# Patient Record
Sex: FEMALE | Race: ASIAN | NOT HISPANIC OR LATINO
[De-identification: names, ages, dates, MRNs, and addresses within clinical notes are randomized per-mention and may not be internally consistent; named-entity substitution may affect disease eponyms.]

---

## 2018-02-22 PROBLEM — Z00.00 ENCOUNTER FOR PREVENTIVE HEALTH EXAMINATION: Status: ACTIVE | Noted: 2018-02-22

## 2018-02-28 ENCOUNTER — APPOINTMENT (OUTPATIENT)
Dept: ULTRASOUND IMAGING | Facility: CLINIC | Age: 38
End: 2018-02-28
Payer: COMMERCIAL

## 2018-02-28 ENCOUNTER — OUTPATIENT (OUTPATIENT)
Dept: OUTPATIENT SERVICES | Facility: HOSPITAL | Age: 38
LOS: 1 days | End: 2018-02-28
Payer: COMMERCIAL

## 2018-02-28 DIAGNOSIS — Z00.8 ENCOUNTER FOR OTHER GENERAL EXAMINATION: ICD-10-CM

## 2018-02-28 PROCEDURE — 76700 US EXAM ABDOM COMPLETE: CPT | Mod: 26

## 2018-02-28 PROCEDURE — 76700 US EXAM ABDOM COMPLETE: CPT

## 2018-05-23 ENCOUNTER — APPOINTMENT (OUTPATIENT)
Dept: HEPATOLOGY | Facility: CLINIC | Age: 38
End: 2018-05-23
Payer: COMMERCIAL

## 2018-05-23 VITALS
BODY MASS INDEX: 20.77 KG/M2 | DIASTOLIC BLOOD PRESSURE: 62 MMHG | WEIGHT: 110 LBS | OXYGEN SATURATION: 98 % | HEART RATE: 75 BPM | TEMPERATURE: 98.5 F | HEIGHT: 61 IN | SYSTOLIC BLOOD PRESSURE: 94 MMHG

## 2018-05-23 DIAGNOSIS — Z86.19 PERSONAL HISTORY OF OTHER INFECTIOUS AND PARASITIC DISEASES: ICD-10-CM

## 2018-05-23 PROCEDURE — 99243 OFF/OP CNSLTJ NEW/EST LOW 30: CPT

## 2018-05-23 RX ORDER — TENOFOVIR DISOPROXIL FUMARATE 300 MG/1
300 TABLET ORAL
Qty: 30 | Refills: 0 | Status: DISCONTINUED | COMMUNITY
Start: 2018-05-20 | End: 2018-05-23

## 2018-05-24 LAB
AFP-TM SERPL-MCNC: 1.7 NG/ML
ALBUMIN SERPL ELPH-MCNC: 4.2 G/DL
ALP BLD-CCNC: 59 U/L
ALT SERPL-CCNC: 29 U/L
ANION GAP SERPL CALC-SCNC: 14 MMOL/L
AST SERPL-CCNC: 27 U/L
BASOPHILS # BLD AUTO: 0.01 K/UL
BASOPHILS NFR BLD AUTO: 0.2 %
BILIRUB SERPL-MCNC: 0.5 MG/DL
BUN SERPL-MCNC: 11 MG/DL
CALCIUM SERPL-MCNC: 9.4 MG/DL
CHLORIDE SERPL-SCNC: 102 MMOL/L
CO2 SERPL-SCNC: 25 MMOL/L
CREAT SERPL-MCNC: 0.84 MG/DL
EOSINOPHIL # BLD AUTO: 0.04 K/UL
EOSINOPHIL NFR BLD AUTO: 0.7 %
GLUCOSE SERPL-MCNC: 86 MG/DL
HBV CORE IGG+IGM SER QL: REACTIVE
HBV DNA # SERPL NAA+PROBE: NORMAL IU/ML
HBV SURFACE AB SER QL: NONREACTIVE
HBV SURFACE AG SER QL: REACTIVE
HCT VFR BLD CALC: 40 %
HCV AB SER QL: NONREACTIVE
HCV S/CO RATIO: 0.14 S/CO
HEPB DNA PCR LOG: ABNORMAL LOGIU/ML
HGB BLD-MCNC: 13.3 G/DL
IMM GRANULOCYTES NFR BLD AUTO: 0.3 %
LYMPHOCYTES # BLD AUTO: 1.51 K/UL
LYMPHOCYTES NFR BLD AUTO: 25 %
MAN DIFF?: NORMAL
MCHC RBC-ENTMCNC: 30.8 PG
MCHC RBC-ENTMCNC: 33.3 GM/DL
MCV RBC AUTO: 92.6 FL
MONOCYTES # BLD AUTO: 0.36 K/UL
MONOCYTES NFR BLD AUTO: 6 %
NEUTROPHILS # BLD AUTO: 4.1 K/UL
NEUTROPHILS NFR BLD AUTO: 67.8 %
PLATELET # BLD AUTO: 192 K/UL
POTASSIUM SERPL-SCNC: 3.8 MMOL/L
PROT SERPL-MCNC: 7.6 G/DL
RBC # BLD: 4.32 M/UL
RBC # FLD: 13 %
SODIUM SERPL-SCNC: 141 MMOL/L
WBC # FLD AUTO: 6.04 K/UL

## 2018-05-25 LAB
HBV E AB SER QL: POSITIVE
HBV E AG SER QL: NEGATIVE

## 2018-05-30 LAB — HDV AB SER IA-ACNC: NEGATIVE

## 2018-06-01 LAB
ESTRADIOL SERPL HS-MCNC: NORMAL
ESTRIOL SERPL-MCNC: NORMAL
ESTRONE SERPL-MCNC: NORMAL
HEPATITIS B GENOTYPE & DRUG RESISTANCE: NOT DETECTED

## 2018-06-06 ENCOUNTER — APPOINTMENT (OUTPATIENT)
Dept: HEPATOLOGY | Facility: CLINIC | Age: 38
End: 2018-06-06
Payer: COMMERCIAL

## 2018-06-06 PROCEDURE — 91200 LIVER ELASTOGRAPHY: CPT

## 2018-12-06 ENCOUNTER — APPOINTMENT (OUTPATIENT)
Dept: HEPATOLOGY | Facility: CLINIC | Age: 38
End: 2018-12-06
Payer: COMMERCIAL

## 2018-12-06 VITALS
TEMPERATURE: 98.6 F | DIASTOLIC BLOOD PRESSURE: 79 MMHG | HEART RATE: 79 BPM | SYSTOLIC BLOOD PRESSURE: 117 MMHG | RESPIRATION RATE: 14 BRPM | BODY MASS INDEX: 20.77 KG/M2 | WEIGHT: 110 LBS | HEIGHT: 61 IN

## 2018-12-06 PROCEDURE — 99213 OFFICE O/P EST LOW 20 MIN: CPT

## 2018-12-07 LAB
AFP-TM SERPL-MCNC: 1.7 NG/ML
ALBUMIN SERPL ELPH-MCNC: 4.3 G/DL
ALP BLD-CCNC: 52 U/L
ALT SERPL-CCNC: 20 U/L
ANION GAP SERPL CALC-SCNC: 13 MMOL/L
AST SERPL-CCNC: 22 U/L
BASOPHILS # BLD AUTO: 0.01 K/UL
BASOPHILS NFR BLD AUTO: 0.2 %
BILIRUB SERPL-MCNC: 0.5 MG/DL
BUN SERPL-MCNC: 11 MG/DL
CALCIUM SERPL-MCNC: 9.2 MG/DL
CHLORIDE SERPL-SCNC: 103 MMOL/L
CO2 SERPL-SCNC: 25 MMOL/L
CREAT SERPL-MCNC: 0.7 MG/DL
EOSINOPHIL # BLD AUTO: 0.05 K/UL
EOSINOPHIL NFR BLD AUTO: 0.9 %
GLUCOSE SERPL-MCNC: 77 MG/DL
HBV DNA # SERPL NAA+PROBE: NOT DETECTED IU/ML
HBV SURFACE AB SER QL: NONREACTIVE
HBV SURFACE AG SER QL: REACTIVE
HCT VFR BLD CALC: 38.5 %
HEPB DNA PCR LOG: NOT DETECTED LOGIU/ML
HGB BLD-MCNC: 12.5 G/DL
IMM GRANULOCYTES NFR BLD AUTO: 0.2 %
LYMPHOCYTES # BLD AUTO: 1.71 K/UL
LYMPHOCYTES NFR BLD AUTO: 30.3 %
MAN DIFF?: NORMAL
MCHC RBC-ENTMCNC: 29.8 PG
MCHC RBC-ENTMCNC: 32.5 GM/DL
MCV RBC AUTO: 91.7 FL
MONOCYTES # BLD AUTO: 0.4 K/UL
MONOCYTES NFR BLD AUTO: 7.1 %
NEUTROPHILS # BLD AUTO: 3.46 K/UL
NEUTROPHILS NFR BLD AUTO: 61.3 %
PLATELET # BLD AUTO: 228 K/UL
POTASSIUM SERPL-SCNC: 3.8 MMOL/L
PROT SERPL-MCNC: 7.3 G/DL
RBC # BLD: 4.2 M/UL
RBC # FLD: 13.8 %
SODIUM SERPL-SCNC: 141 MMOL/L
WBC # FLD AUTO: 5.64 K/UL

## 2018-12-11 ENCOUNTER — APPOINTMENT (OUTPATIENT)
Dept: ULTRASOUND IMAGING | Facility: CLINIC | Age: 38
End: 2018-12-11

## 2018-12-18 ENCOUNTER — FORM ENCOUNTER (OUTPATIENT)
Age: 38
End: 2018-12-18

## 2018-12-19 ENCOUNTER — OUTPATIENT (OUTPATIENT)
Dept: OUTPATIENT SERVICES | Facility: HOSPITAL | Age: 38
LOS: 1 days | End: 2018-12-19
Payer: COMMERCIAL

## 2018-12-19 ENCOUNTER — APPOINTMENT (OUTPATIENT)
Dept: ULTRASOUND IMAGING | Facility: CLINIC | Age: 38
End: 2018-12-19
Payer: COMMERCIAL

## 2018-12-19 DIAGNOSIS — Z00.8 ENCOUNTER FOR OTHER GENERAL EXAMINATION: ICD-10-CM

## 2018-12-19 PROCEDURE — 76700 US EXAM ABDOM COMPLETE: CPT

## 2018-12-19 PROCEDURE — 76700 US EXAM ABDOM COMPLETE: CPT | Mod: 26

## 2019-06-13 ENCOUNTER — APPOINTMENT (OUTPATIENT)
Dept: HEPATOLOGY | Facility: CLINIC | Age: 39
End: 2019-06-13
Payer: COMMERCIAL

## 2019-06-13 VITALS
RESPIRATION RATE: 14 BRPM | SYSTOLIC BLOOD PRESSURE: 112 MMHG | TEMPERATURE: 98.2 F | WEIGHT: 110 LBS | HEIGHT: 61 IN | HEART RATE: 76 BPM | BODY MASS INDEX: 20.77 KG/M2 | DIASTOLIC BLOOD PRESSURE: 66 MMHG

## 2019-06-13 PROCEDURE — 99214 OFFICE O/P EST MOD 30 MIN: CPT

## 2019-06-13 RX ORDER — ERGOCALCIFEROL 1.25 MG/1
1.25 MG CAPSULE, LIQUID FILLED ORAL
Qty: 12 | Refills: 0 | Status: DISCONTINUED | COMMUNITY
Start: 2018-02-21 | End: 2019-06-13

## 2019-06-14 LAB
AFP-TM SERPL-MCNC: <1.8 NG/ML
ALBUMIN SERPL ELPH-MCNC: 4.4 G/DL
ALP BLD-CCNC: 60 U/L
ALT SERPL-CCNC: 27 U/L
ANION GAP SERPL CALC-SCNC: 11 MMOL/L
APPEARANCE: CLEAR
AST SERPL-CCNC: 27 U/L
BACTERIA: NEGATIVE
BASOPHILS # BLD AUTO: 0.02 K/UL
BASOPHILS NFR BLD AUTO: 0.3 %
BILIRUB SERPL-MCNC: 0.3 MG/DL
BILIRUBIN URINE: NEGATIVE
BLOOD URINE: NEGATIVE
BUN SERPL-MCNC: 13 MG/DL
CALCIUM SERPL-MCNC: 9.2 MG/DL
CHLORIDE SERPL-SCNC: 104 MMOL/L
CO2 SERPL-SCNC: 26 MMOL/L
COLOR: COLORLESS
CREAT SERPL-MCNC: 0.84 MG/DL
EOSINOPHIL # BLD AUTO: 0.08 K/UL
EOSINOPHIL NFR BLD AUTO: 1.3 %
GLUCOSE QUALITATIVE U: NEGATIVE
GLUCOSE SERPL-MCNC: 92 MG/DL
HBV SURFACE AB SER QL: NONREACTIVE
HBV SURFACE AG SER QL: REACTIVE
HCT VFR BLD CALC: 37.1 %
HGB BLD-MCNC: 11.9 G/DL
HYALINE CASTS: 0 /LPF
IMM GRANULOCYTES NFR BLD AUTO: 0.2 %
KETONES URINE: NEGATIVE
LEUKOCYTE ESTERASE URINE: NEGATIVE
LYMPHOCYTES # BLD AUTO: 2.04 K/UL
LYMPHOCYTES NFR BLD AUTO: 33.3 %
MAN DIFF?: NORMAL
MCHC RBC-ENTMCNC: 28.8 PG
MCHC RBC-ENTMCNC: 32.1 GM/DL
MCV RBC AUTO: 89.8 FL
MICROSCOPIC-UA: NORMAL
MONOCYTES # BLD AUTO: 0.36 K/UL
MONOCYTES NFR BLD AUTO: 5.9 %
NEUTROPHILS # BLD AUTO: 3.62 K/UL
NEUTROPHILS NFR BLD AUTO: 59 %
NITRITE URINE: NEGATIVE
PH URINE: 7.5
PLATELET # BLD AUTO: 230 K/UL
POTASSIUM SERPL-SCNC: 4 MMOL/L
PROT SERPL-MCNC: 7 G/DL
PROTEIN URINE: NEGATIVE
RBC # BLD: 4.13 M/UL
RBC # FLD: 13.8 %
RED BLOOD CELLS URINE: 2 /HPF
SODIUM SERPL-SCNC: 141 MMOL/L
SPECIFIC GRAVITY URINE: 1.01
SQUAMOUS EPITHELIAL CELLS: 6 /HPF
UROBILINOGEN URINE: NORMAL
WBC # FLD AUTO: 6.13 K/UL
WHITE BLOOD CELLS URINE: 3 /HPF

## 2019-06-19 LAB
HBV DNA # SERPL NAA+PROBE: NOT DETECTED IU/ML
HEPB DNA PCR LOG: NOT DETECTED LOGIU/ML

## 2019-06-25 LAB
MISCELLANEOUS TEST: NORMAL
PROC NAME: NORMAL

## 2019-06-26 ENCOUNTER — APPOINTMENT (OUTPATIENT)
Dept: ULTRASOUND IMAGING | Facility: IMAGING CENTER | Age: 39
End: 2019-06-26

## 2019-06-26 ENCOUNTER — APPOINTMENT (OUTPATIENT)
Dept: RADIOLOGY | Facility: IMAGING CENTER | Age: 39
End: 2019-06-26

## 2019-12-06 ENCOUNTER — APPOINTMENT (OUTPATIENT)
Dept: HEPATOLOGY | Facility: CLINIC | Age: 39
End: 2019-12-06
Payer: COMMERCIAL

## 2019-12-06 VITALS
SYSTOLIC BLOOD PRESSURE: 120 MMHG | DIASTOLIC BLOOD PRESSURE: 71 MMHG | BODY MASS INDEX: 21.9 KG/M2 | TEMPERATURE: 98.1 F | HEIGHT: 61 IN | RESPIRATION RATE: 14 BRPM | HEART RATE: 72 BPM | WEIGHT: 116 LBS

## 2019-12-06 PROCEDURE — 99213 OFFICE O/P EST LOW 20 MIN: CPT

## 2019-12-06 RX ORDER — TENOFOVIR DISOPROXIL FUMARATE 300 MG/1
300 TABLET ORAL
Qty: 30 | Refills: 5 | Status: DISCONTINUED | COMMUNITY
End: 2019-12-06

## 2019-12-08 LAB
AFP-TM SERPL-MCNC: <1.8 NG/ML
ALBUMIN SERPL ELPH-MCNC: 4.5 G/DL
ALP BLD-CCNC: 57 U/L
ALT SERPL-CCNC: 24 U/L
ANION GAP SERPL CALC-SCNC: 15 MMOL/L
AST SERPL-CCNC: 23 U/L
BASOPHILS # BLD AUTO: 0.03 K/UL
BASOPHILS NFR BLD AUTO: 0.5 %
BILIRUB SERPL-MCNC: 0.2 MG/DL
BUN SERPL-MCNC: 12 MG/DL
CALCIUM SERPL-MCNC: 9.2 MG/DL
CHLORIDE SERPL-SCNC: 102 MMOL/L
CO2 SERPL-SCNC: 25 MMOL/L
CREAT SERPL-MCNC: 0.85 MG/DL
EOSINOPHIL # BLD AUTO: 0.07 K/UL
EOSINOPHIL NFR BLD AUTO: 1.1 %
GLUCOSE SERPL-MCNC: 96 MG/DL
HBV CORE IGG+IGM SER QL: REACTIVE
HBV SURFACE AB SER QL: NONREACTIVE
HBV SURFACE AG SER QL: REACTIVE
HCT VFR BLD CALC: 39.1 %
HGB BLD-MCNC: 12.9 G/DL
IMM GRANULOCYTES NFR BLD AUTO: 0.2 %
LYMPHOCYTES # BLD AUTO: 2 K/UL
LYMPHOCYTES NFR BLD AUTO: 30.1 %
MAN DIFF?: NORMAL
MCHC RBC-ENTMCNC: 30.3 PG
MCHC RBC-ENTMCNC: 33 GM/DL
MCV RBC AUTO: 91.8 FL
MONOCYTES # BLD AUTO: 0.36 K/UL
MONOCYTES NFR BLD AUTO: 5.4 %
NEUTROPHILS # BLD AUTO: 4.18 K/UL
NEUTROPHILS NFR BLD AUTO: 62.7 %
PLATELET # BLD AUTO: 243 K/UL
POTASSIUM SERPL-SCNC: 3.9 MMOL/L
PROT SERPL-MCNC: 7 G/DL
RBC # BLD: 4.26 M/UL
RBC # FLD: 13.2 %
SODIUM SERPL-SCNC: 142 MMOL/L
WBC # FLD AUTO: 6.65 K/UL

## 2019-12-09 LAB
HBV DNA # SERPL NAA+PROBE: NOT DETECTED
HEPB DNA PCR LOG: NOT DETECTED LOG10IU/ML

## 2019-12-10 LAB — HBV E AG SER QL: NEGATIVE

## 2019-12-11 LAB — HBV E AB SER QL: POSITIVE

## 2020-02-22 ENCOUNTER — APPOINTMENT (OUTPATIENT)
Dept: ULTRASOUND IMAGING | Facility: HOSPITAL | Age: 40
End: 2020-02-22
Payer: COMMERCIAL

## 2020-02-22 ENCOUNTER — APPOINTMENT (OUTPATIENT)
Dept: RADIOLOGY | Facility: HOSPITAL | Age: 40
End: 2020-02-22
Payer: COMMERCIAL

## 2020-02-22 ENCOUNTER — OUTPATIENT (OUTPATIENT)
Dept: OUTPATIENT SERVICES | Facility: HOSPITAL | Age: 40
LOS: 1 days | End: 2020-02-22
Payer: COMMERCIAL

## 2020-02-22 DIAGNOSIS — B18.1 CHRONIC VIRAL HEPATITIS B WITHOUT DELTA-AGENT: ICD-10-CM

## 2020-02-22 PROCEDURE — 76700 US EXAM ABDOM COMPLETE: CPT

## 2020-02-22 PROCEDURE — 77080 DXA BONE DENSITY AXIAL: CPT | Mod: 26

## 2020-02-22 PROCEDURE — 76700 US EXAM ABDOM COMPLETE: CPT | Mod: 26

## 2020-02-22 PROCEDURE — 77080 DXA BONE DENSITY AXIAL: CPT

## 2020-06-09 ENCOUNTER — APPOINTMENT (OUTPATIENT)
Dept: HEPATOLOGY | Facility: CLINIC | Age: 40
End: 2020-06-09
Payer: COMMERCIAL

## 2020-06-09 PROCEDURE — 99443: CPT

## 2020-07-15 ENCOUNTER — APPOINTMENT (OUTPATIENT)
Dept: HEPATOLOGY | Facility: CLINIC | Age: 40
End: 2020-07-15

## 2020-07-20 LAB
AFP-TM SERPL-MCNC: 1.8 NG/ML
ALBUMIN SERPL ELPH-MCNC: 4.4 G/DL
ALP BLD-CCNC: 53 U/L
ALT SERPL-CCNC: 21 U/L
ANION GAP SERPL CALC-SCNC: 13 MMOL/L
AST SERPL-CCNC: 22 U/L
BASOPHILS # BLD AUTO: 0.02 K/UL
BASOPHILS NFR BLD AUTO: 0.4 %
BILIRUB SERPL-MCNC: 0.3 MG/DL
BUN SERPL-MCNC: 13 MG/DL
CALCIUM SERPL-MCNC: 9.1 MG/DL
CHLORIDE SERPL-SCNC: 102 MMOL/L
CO2 SERPL-SCNC: 25 MMOL/L
CREAT SERPL-MCNC: 0.79 MG/DL
EOSINOPHIL # BLD AUTO: 0.05 K/UL
EOSINOPHIL NFR BLD AUTO: 1 %
GLUCOSE SERPL-MCNC: 95 MG/DL
HBV SURFACE AB SER QL: NONREACTIVE
HBV SURFACE AG SER QL: REACTIVE
HCT VFR BLD CALC: 42.7 %
HGB BLD-MCNC: 13.7 G/DL
IMM GRANULOCYTES NFR BLD AUTO: 0.2 %
LYMPHOCYTES # BLD AUTO: 1.85 K/UL
LYMPHOCYTES NFR BLD AUTO: 36.9 %
MAN DIFF?: NORMAL
MCHC RBC-ENTMCNC: 30.3 PG
MCHC RBC-ENTMCNC: 32.1 GM/DL
MCV RBC AUTO: 94.5 FL
MONOCYTES # BLD AUTO: 0.36 K/UL
MONOCYTES NFR BLD AUTO: 7.2 %
NEUTROPHILS # BLD AUTO: 2.72 K/UL
NEUTROPHILS NFR BLD AUTO: 54.3 %
PLATELET # BLD AUTO: 214 K/UL
POTASSIUM SERPL-SCNC: 4.1 MMOL/L
PROT SERPL-MCNC: 7.1 G/DL
RBC # BLD: 4.52 M/UL
RBC # FLD: 12.4 %
SODIUM SERPL-SCNC: 140 MMOL/L
WBC # FLD AUTO: 5.01 K/UL

## 2020-07-22 LAB
HBV DNA # SERPL NAA+PROBE: NOT DETECTED
HEPB DNA PCR LOG: NOT DETECTED LOG10IU/ML

## 2020-07-23 ENCOUNTER — OUTPATIENT (OUTPATIENT)
Dept: OUTPATIENT SERVICES | Facility: HOSPITAL | Age: 40
LOS: 1 days | End: 2020-07-23
Payer: COMMERCIAL

## 2020-07-23 ENCOUNTER — APPOINTMENT (OUTPATIENT)
Dept: ULTRASOUND IMAGING | Facility: CLINIC | Age: 40
End: 2020-07-23

## 2020-07-23 DIAGNOSIS — Z00.8 ENCOUNTER FOR OTHER GENERAL EXAMINATION: ICD-10-CM

## 2020-07-23 PROCEDURE — 76700 US EXAM ABDOM COMPLETE: CPT

## 2020-07-23 PROCEDURE — 76700 US EXAM ABDOM COMPLETE: CPT | Mod: 26

## 2021-09-14 ENCOUNTER — APPOINTMENT (OUTPATIENT)
Dept: HEPATOLOGY | Facility: CLINIC | Age: 41
End: 2021-09-14
Payer: COMMERCIAL

## 2021-09-14 VITALS
WEIGHT: 114 LBS | HEIGHT: 61 IN | DIASTOLIC BLOOD PRESSURE: 84 MMHG | BODY MASS INDEX: 21.52 KG/M2 | HEART RATE: 71 BPM | SYSTOLIC BLOOD PRESSURE: 128 MMHG | RESPIRATION RATE: 14 BRPM | TEMPERATURE: 97.9 F

## 2021-09-14 PROCEDURE — 99214 OFFICE O/P EST MOD 30 MIN: CPT

## 2021-09-14 NOTE — REVIEW OF SYSTEMS
[Negative] : Heme/Lymph [Fever] : no fever [Chills] : no chills [Feeling Tired] : not feeling tired [Recent Weight Gain (___ Lbs)] : no recent weight gain [Recent Weight Loss (___ Lbs)] : no recent weight loss [Eye Pain] : no eye pain [Red Eyes] : eyes not red [Eyesight Problems] : no eyesight problems [Dry Eyes] : no dryness of the eyes [Chest Pain] : no chest pain [Palpitations] : no palpitations [Lower Ext Edema] : no lower extremity edema [Shortness Of Breath] : no shortness of breath [Wheezing] : no wheezing [Cough] : no cough [Abdominal Pain] : no abdominal pain [Vomiting] : no vomiting [Constipation] : no constipation [Diarrhea] : no diarrhea [Heartburn] : no heartburn [Melena] : no melena [Limb Pain] : no limb pain [Limb Swelling] : no limb swelling [Itching] : no itching [FreeTextEntry7] : patient denies nausea, hematemesis, rectal bleeding or jaundice.  [FreeTextEntry9] : patient has neck pain

## 2021-09-14 NOTE — ASSESSMENT
[FreeTextEntry1] : 42 y/o Chinese woman here for chronic HBV (eAb+, on Vemlidy).\par \par She has chronic hepatitis B, eAb positive, was started on TDF since 3/2018 by her outside physician when she developed high HBV viremia of 42,142 IU/ml but normal ALT. She responded to TDF and was switched to Vemlidy for better kidney and BMD safety profile since 6/2019.  She has tolerated Vemlidy  without side effects. \par Her viral load has been undetectable. \par \par I have explained to the patient the natural history of chronic hepatitis B, disease progression to cirrhosis and risk of HCC and risks and benefits and side effects of Vemlidy and adverse effects of sudden discontinuation of Vemlidy. \par \par DEXA 2/2020 normal.\par \par Labs and ultrasound q 6 months ordered. \par \par I have recommended avoidance of hepatotoxic agents including herbs and alcohol and  a return visit in 6 months.\par \par RTC 6 months w/ Fibroscan\par \par

## 2021-09-14 NOTE — HISTORY OF PRESENT ILLNESS
[___ Month(s) Ago] : [unfilled] month(s) ago [None] : ~he/she~ had no significant interval events [IV Drug Use] : no IV drug use [Tattoo] : no tattoos [Body Piercing] : no body piercing [Hemodialysis] : no hemodialysis [Transfusion before 1992] : no transfusion before 1992 [Transplant before 1992] : no transplant before 1992 [Alcohol Abuse] : no alcohol abuse [Autoimmune Disorder] : no autoimmune disorder [Household Contact to HBV] : no household contact to HBV [Occupational Exposure] : no occupational exposure [de-identified] : 40 y/o Chinese woman here for chronic HBV (eAb+, on Vemlidy).\par \par She was diagnosed with hepatitis B more than 20 years ago in [place] when she developed elevated liver tests. She reportedly was treated with injection for hepatitis B, (IFN?) for 3 months which was discontinued due to intolerance. \par \par She denies family history of hepatitis B. Her  reportedly is vaccinated against HBV. Her child [place] received HBV immunoprophylaxis at birth.and reportedly does not have hepatitis B.  \par \par [place], had blood work and abdominal US every 6 months to 1 year per her PCP for years. She remained HBV treatment naive till 2/2018 when she developed high HBV  DNA  of 42,142 IU/ml . She was started on TDF per outside physician for high HBV viremia with normal liver tests, She responded to TDF and HBV DNA was detected < 15, ALT was 29 about 3 months later. \par \par Fibroscan from 6/2018 reported S0 steatosis and F0-F1 fibrosis. \par \par Her liver tests have been normal and HBV undetected since 12/2018. HBsAg remained positive and Edwardo HBsAg was 8196.  Abdominal US has been negative for HCC over past several years. \par Patient was switched from  TDF to Vemlidy since 6/2019. She has tolerated Vemlidy  without side effects.\par \par BW from 12/2019 showed normal CBC, normal liver tests, normal AFP, positive HBsAg, positive anti-HBc total, undetected HBV DNA , negative anti-HBs, positive eAb, negative eAg. \par \par Bone density from 2/2020 was normal. \par \par 9/14/21 visit - still tolerating Vemlidy, for about the past 3 years. She had a ultrasound done 1/2021.

## 2021-09-15 LAB
AFP-TM SERPL-MCNC: <1.8 NG/ML
ALBUMIN SERPL ELPH-MCNC: 4.7 G/DL
ALP BLD-CCNC: 62 U/L
ALT SERPL-CCNC: 17 U/L
ANION GAP SERPL CALC-SCNC: 12 MMOL/L
AST SERPL-CCNC: 20 U/L
BASOPHILS # BLD AUTO: 0.02 K/UL
BASOPHILS NFR BLD AUTO: 0.4 %
BILIRUB SERPL-MCNC: 0.2 MG/DL
BUN SERPL-MCNC: 8 MG/DL
CALCIUM SERPL-MCNC: 9.3 MG/DL
CHLORIDE SERPL-SCNC: 105 MMOL/L
CO2 SERPL-SCNC: 26 MMOL/L
CREAT SERPL-MCNC: 0.89 MG/DL
EOSINOPHIL # BLD AUTO: 0.05 K/UL
EOSINOPHIL NFR BLD AUTO: 0.9 %
GLUCOSE SERPL-MCNC: 93 MG/DL
HBV SURFACE AB SER QL: NONREACTIVE
HBV SURFACE AG SER QL: REACTIVE
HCT VFR BLD CALC: 42.8 %
HGB BLD-MCNC: 13.7 G/DL
IMM GRANULOCYTES NFR BLD AUTO: 0.4 %
INR PPP: 1.04 RATIO
LYMPHOCYTES # BLD AUTO: 1.58 K/UL
LYMPHOCYTES NFR BLD AUTO: 28.4 %
MAN DIFF?: NORMAL
MCHC RBC-ENTMCNC: 30.2 PG
MCHC RBC-ENTMCNC: 32 GM/DL
MCV RBC AUTO: 94.3 FL
MONOCYTES # BLD AUTO: 0.35 K/UL
MONOCYTES NFR BLD AUTO: 6.3 %
NEUTROPHILS # BLD AUTO: 3.54 K/UL
NEUTROPHILS NFR BLD AUTO: 63.6 %
PLATELET # BLD AUTO: 235 K/UL
POTASSIUM SERPL-SCNC: 4.3 MMOL/L
PROT SERPL-MCNC: 7.4 G/DL
PT BLD: 12.3 SEC
RBC # BLD: 4.54 M/UL
RBC # FLD: 13.5 %
SODIUM SERPL-SCNC: 143 MMOL/L
WBC # FLD AUTO: 5.56 K/UL

## 2021-09-17 LAB
HBV DNA # SERPL NAA+PROBE: NOT DETECTED IU/ML
HEPB DNA PCR LOG: NOT DETECTED LOG10IU/ML

## 2021-09-21 ENCOUNTER — OUTPATIENT (OUTPATIENT)
Dept: OUTPATIENT SERVICES | Facility: HOSPITAL | Age: 41
LOS: 1 days | End: 2021-09-21
Payer: COMMERCIAL

## 2021-09-21 ENCOUNTER — APPOINTMENT (OUTPATIENT)
Dept: ULTRASOUND IMAGING | Facility: CLINIC | Age: 41
End: 2021-09-21
Payer: COMMERCIAL

## 2021-09-21 DIAGNOSIS — B18.1 CHRONIC VIRAL HEPATITIS B WITHOUT DELTA-AGENT: ICD-10-CM

## 2021-09-21 PROCEDURE — 76700 US EXAM ABDOM COMPLETE: CPT | Mod: 26

## 2021-09-21 PROCEDURE — 76700 US EXAM ABDOM COMPLETE: CPT

## 2021-09-24 DIAGNOSIS — K86.2 CYST OF PANCREAS: ICD-10-CM

## 2021-10-09 ENCOUNTER — APPOINTMENT (OUTPATIENT)
Dept: ULTRASOUND IMAGING | Facility: CLINIC | Age: 41
End: 2021-10-09

## 2021-11-06 ENCOUNTER — APPOINTMENT (OUTPATIENT)
Dept: MRI IMAGING | Facility: CLINIC | Age: 41
End: 2021-11-06
Payer: COMMERCIAL

## 2021-11-06 ENCOUNTER — OUTPATIENT (OUTPATIENT)
Dept: OUTPATIENT SERVICES | Facility: HOSPITAL | Age: 41
LOS: 1 days | End: 2021-11-06
Payer: COMMERCIAL

## 2021-11-06 DIAGNOSIS — K86.2 CYST OF PANCREAS: ICD-10-CM

## 2021-11-06 DIAGNOSIS — Z00.8 ENCOUNTER FOR OTHER GENERAL EXAMINATION: ICD-10-CM

## 2021-11-06 PROCEDURE — 74183 MRI ABD W/O CNTR FLWD CNTR: CPT

## 2021-11-06 PROCEDURE — 74183 MRI ABD W/O CNTR FLWD CNTR: CPT | Mod: 26

## 2021-11-06 PROCEDURE — A9585: CPT

## 2022-03-14 ENCOUNTER — APPOINTMENT (OUTPATIENT)
Dept: HEPATOLOGY | Facility: CLINIC | Age: 42
End: 2022-03-14

## 2022-04-19 ENCOUNTER — APPOINTMENT (OUTPATIENT)
Dept: ULTRASOUND IMAGING | Facility: CLINIC | Age: 42
End: 2022-04-19
Payer: COMMERCIAL

## 2022-04-19 ENCOUNTER — OUTPATIENT (OUTPATIENT)
Dept: OUTPATIENT SERVICES | Facility: HOSPITAL | Age: 42
LOS: 1 days | End: 2022-04-19
Payer: COMMERCIAL

## 2022-04-19 DIAGNOSIS — B18.1 CHRONIC VIRAL HEPATITIS B WITHOUT DELTA-AGENT: ICD-10-CM

## 2022-04-19 DIAGNOSIS — Z00.8 ENCOUNTER FOR OTHER GENERAL EXAMINATION: ICD-10-CM

## 2022-04-19 PROCEDURE — 76700 US EXAM ABDOM COMPLETE: CPT | Mod: 26

## 2022-04-19 PROCEDURE — 76700 US EXAM ABDOM COMPLETE: CPT

## 2022-04-20 LAB
AFP-TM SERPL-MCNC: <1.8 NG/ML
ALBUMIN SERPL ELPH-MCNC: 4.5 G/DL
ALP BLD-CCNC: 59 U/L
ALT SERPL-CCNC: 13 U/L
ANION GAP SERPL CALC-SCNC: 17 MMOL/L
AST SERPL-CCNC: 19 U/L
BASOPHILS # BLD AUTO: 0.02 K/UL
BASOPHILS NFR BLD AUTO: 0.3 %
BILIRUB SERPL-MCNC: 0.4 MG/DL
BUN SERPL-MCNC: 13 MG/DL
CALCIUM SERPL-MCNC: 9.1 MG/DL
CHLORIDE SERPL-SCNC: 104 MMOL/L
CO2 SERPL-SCNC: 22 MMOL/L
CREAT SERPL-MCNC: 0.77 MG/DL
EGFR: 99 ML/MIN/1.73M2
EOSINOPHIL # BLD AUTO: 0.06 K/UL
EOSINOPHIL NFR BLD AUTO: 0.9 %
GLUCOSE SERPL-MCNC: 101 MG/DL
HBV DNA # SERPL NAA+PROBE: <10 IU/ML
HBV SURFACE AB SER QL: NONREACTIVE
HBV SURFACE AG SER QL: REACTIVE
HCT VFR BLD CALC: 40.7 %
HEPB DNA PCR INT: DETECTED
HEPB DNA PCR LOG: <1 LOGIU/ML
HGB BLD-MCNC: 13.5 G/DL
IMM GRANULOCYTES NFR BLD AUTO: 0.2 %
INR PPP: 1.06 RATIO
LYMPHOCYTES # BLD AUTO: 2.02 K/UL
LYMPHOCYTES NFR BLD AUTO: 31.7 %
MAN DIFF?: NORMAL
MCHC RBC-ENTMCNC: 31 PG
MCHC RBC-ENTMCNC: 33.2 GM/DL
MCV RBC AUTO: 93.6 FL
MONOCYTES # BLD AUTO: 0.4 K/UL
MONOCYTES NFR BLD AUTO: 6.3 %
NEUTROPHILS # BLD AUTO: 3.87 K/UL
NEUTROPHILS NFR BLD AUTO: 60.6 %
PLATELET # BLD AUTO: 218 K/UL
POTASSIUM SERPL-SCNC: 3.7 MMOL/L
PROT SERPL-MCNC: 7.1 G/DL
PT BLD: 12.3 SEC
RBC # BLD: 4.35 M/UL
RBC # FLD: 12.9 %
SODIUM SERPL-SCNC: 143 MMOL/L
WBC # FLD AUTO: 6.38 K/UL

## 2022-07-20 ENCOUNTER — APPOINTMENT (OUTPATIENT)
Dept: HEPATOLOGY | Facility: CLINIC | Age: 42
End: 2022-07-20

## 2022-07-20 VITALS
OXYGEN SATURATION: 98 % | DIASTOLIC BLOOD PRESSURE: 85 MMHG | HEIGHT: 61 IN | WEIGHT: 113 LBS | HEART RATE: 71 BPM | TEMPERATURE: 97.6 F | RESPIRATION RATE: 16 BRPM | SYSTOLIC BLOOD PRESSURE: 122 MMHG | BODY MASS INDEX: 21.34 KG/M2

## 2022-07-20 PROCEDURE — 99214 OFFICE O/P EST MOD 30 MIN: CPT

## 2022-07-20 NOTE — HISTORY OF PRESENT ILLNESS
[___ Month(s) Ago] : [unfilled] month(s) ago [None] : ~he/she~ had no significant interval events [IV Drug Use] : no IV drug use [Tattoo] : no tattoos [Body Piercing] : no body piercing [Hemodialysis] : no hemodialysis [Transfusion before 1992] : no transfusion before 1992 [Transplant before 1992] : no transplant before 1992 [Alcohol Abuse] : no alcohol abuse [Autoimmune Disorder] : no autoimmune disorder [Household Contact to HBV] : no household contact to HBV [Occupational Exposure] : no occupational exposure [de-identified] : 43 y/o Chinese woman here for chronic HBV (eAb+, on Vemlidy).\par \par She was diagnosed with hepatitis B more than 20 years ago in [place] when she developed elevated liver tests. She reportedly was treated with injection for hepatitis B, (IFN?) for 3 months which was discontinued due to intolerance. \par \par She denies family history of hepatitis B. Her  reportedly is vaccinated against HBV. Her child [place] received HBV immunoprophylaxis at birth.and reportedly does not have hepatitis B.  \par \par [place], had blood work and abdominal US every 6 months to 1 year per her PCP for years. She remained HBV treatment naive till 2/2018 when she developed high HBV  DNA  of 42,142 IU/ml . She was started on TDF per outside physician for high HBV viremia with normal liver tests, She responded to TDF and HBV DNA was detected < 15, ALT was 29 about 3 months later. \[facility] \[facility] Fibroscan from 6/2018 reported S0 steatosis and F0-F1 fibrosis. \par \par Her liver tests have been normal and HBV undetected since 12/2018. HBsAg remained positive and Edwardo HBsAg was 8196.  Abdominal US has been negative for HCC over past several years. \par Patient was switched from  TDF to Vemlidy since 6/2019. She has tolerated Vemlidy  without side effects.\[facility] BW from 12/2019 showed normal CBC, normal liver tests, normal AFP, positive HBsAg, positive anti-HBc total, undetected HBV DNA , negative anti-HBs, positive eAb, negative eAg. \[facility] Bone density from 2/2020 was normal. \[facility] 9/14/21 visit - still tolerating Vemlidy, for about the past 3 years. She had a ultrasound done 1/2021. \[facility] 7/20/22 visit - doing well. labs from april and ultrasound reviewed. fibroscan scheduled for last visit was cancelled.

## 2023-01-23 ENCOUNTER — APPOINTMENT (OUTPATIENT)
Dept: HEPATOLOGY | Facility: CLINIC | Age: 43
End: 2023-01-23
Payer: COMMERCIAL

## 2023-01-23 PROCEDURE — 76981 USE PARENCHYMA: CPT

## 2023-01-26 ENCOUNTER — APPOINTMENT (OUTPATIENT)
Dept: ULTRASOUND IMAGING | Facility: CLINIC | Age: 43
End: 2023-01-26
Payer: COMMERCIAL

## 2023-01-26 ENCOUNTER — OUTPATIENT (OUTPATIENT)
Dept: OUTPATIENT SERVICES | Facility: HOSPITAL | Age: 43
LOS: 1 days | End: 2023-01-26
Payer: COMMERCIAL

## 2023-01-26 ENCOUNTER — RESULT REVIEW (OUTPATIENT)
Age: 43
End: 2023-01-26

## 2023-01-26 DIAGNOSIS — B18.1 CHRONIC VIRAL HEPATITIS B WITHOUT DELTA-AGENT: ICD-10-CM

## 2023-01-26 LAB
AFP-TM SERPL-MCNC: <1.8 NG/ML
ALBUMIN SERPL ELPH-MCNC: 4.3 G/DL
ALP BLD-CCNC: 53 U/L
ALT SERPL-CCNC: 14 U/L
ANION GAP SERPL CALC-SCNC: 12 MMOL/L
AST SERPL-CCNC: 17 U/L
BASOPHILS # BLD AUTO: 0.03 K/UL
BASOPHILS NFR BLD AUTO: 0.5 %
BILIRUB SERPL-MCNC: 0.3 MG/DL
BUN SERPL-MCNC: 15 MG/DL
CALCIUM SERPL-MCNC: 8.9 MG/DL
CHLORIDE SERPL-SCNC: 106 MMOL/L
CO2 SERPL-SCNC: 23 MMOL/L
CREAT SERPL-MCNC: 0.71 MG/DL
EGFR: 109 ML/MIN/1.73M2
EOSINOPHIL # BLD AUTO: 0.08 K/UL
EOSINOPHIL NFR BLD AUTO: 1.4 %
GLUCOSE SERPL-MCNC: 102 MG/DL
HBV DNA # SERPL NAA+PROBE: <10 IU/ML
HBV SURFACE AG SER QL: REACTIVE
HCT VFR BLD CALC: 38.3 %
HEPB DNA PCR INT: DETECTED
HEPB DNA PCR LOG: <1 LOGIU/ML
HGB BLD-MCNC: 12.8 G/DL
IMM GRANULOCYTES NFR BLD AUTO: 0.2 %
INR PPP: 1.1 RATIO
LYMPHOCYTES # BLD AUTO: 1.57 K/UL
LYMPHOCYTES NFR BLD AUTO: 26.8 %
MAN DIFF?: NORMAL
MCHC RBC-ENTMCNC: 30.7 PG
MCHC RBC-ENTMCNC: 33.4 GM/DL
MCV RBC AUTO: 91.8 FL
MONOCYTES # BLD AUTO: 0.47 K/UL
MONOCYTES NFR BLD AUTO: 8 %
NEUTROPHILS # BLD AUTO: 3.7 K/UL
NEUTROPHILS NFR BLD AUTO: 63.1 %
PLATELET # BLD AUTO: 183 K/UL
POTASSIUM SERPL-SCNC: 4 MMOL/L
PROT SERPL-MCNC: 6.7 G/DL
PT BLD: 12.8 SEC
RBC # BLD: 4.17 M/UL
RBC # FLD: 12.8 %
SODIUM SERPL-SCNC: 142 MMOL/L
WBC # FLD AUTO: 5.86 K/UL

## 2023-01-26 PROCEDURE — 76700 US EXAM ABDOM COMPLETE: CPT | Mod: 26

## 2023-01-26 PROCEDURE — 76700 US EXAM ABDOM COMPLETE: CPT

## 2023-02-06 ENCOUNTER — APPOINTMENT (OUTPATIENT)
Dept: HEPATOLOGY | Facility: CLINIC | Age: 43
End: 2023-02-06
Payer: COMMERCIAL

## 2023-02-06 ENCOUNTER — NON-APPOINTMENT (OUTPATIENT)
Age: 43
End: 2023-02-06

## 2023-02-06 NOTE — HISTORY OF PRESENT ILLNESS
[FreeTextEntry1] : Former pt of Dr. Jordan\par Andres Garduno is a 41 y/o female with chronic hepatitis B on Vemlidy who presents to establish the care. She recently had labs/fibroscan/imaging.  She was diagnosed with hep B in 2002.\par \par [Medical Hx]\par [Surgical Hx]\par [Social Hx] \par Alcohol:                Tobacco:      \par illicit drug:            Herb and dietary Supplement:  \par [FMH of liver disease]\par \par [Labs]\par 1/23/23\par INR 1.10 CBC wnl  \par AFP <1.8 CMP wnl ALT 14\par HBsAg+ HBV DNA detected <10IU/mL\par \par [Imaging]\par US abd 1/26/23: Mildly coarsened hepatic echotexture. No focal hepatic lesion.\par \par Fibroscan 1/23/23: F0-1 3.3kPa S0\par \par MRI/MRCP 11/6/21: No pancreatic lesion identified to correspond with sonographic finding. I suspect in retrospect that the finding on sonogram represents a segment of the tortuous splenic vein in close proximity to the pancreatic tail.\par \par [Procedures]\par EGD:\par Colonoscopy:\par \par [Plan]\par # \par - Education and counseling were provided to the patient.\par - Discussed at length with the patient that next course of action would depend on results\par \par #Preventive care\par DEXA 2/2020 normal.\par

## 2023-02-13 ENCOUNTER — APPOINTMENT (OUTPATIENT)
Dept: HEPATOLOGY | Facility: CLINIC | Age: 43
End: 2023-02-13
Payer: COMMERCIAL

## 2023-02-13 VITALS
BODY MASS INDEX: 22.09 KG/M2 | HEART RATE: 69 BPM | WEIGHT: 117 LBS | DIASTOLIC BLOOD PRESSURE: 76 MMHG | HEIGHT: 61 IN | TEMPERATURE: 97.8 F | RESPIRATION RATE: 16 BRPM | SYSTOLIC BLOOD PRESSURE: 117 MMHG | OXYGEN SATURATION: 100 %

## 2023-02-13 PROCEDURE — 99204 OFFICE O/P NEW MOD 45 MIN: CPT | Mod: 25

## 2023-06-30 ENCOUNTER — LABORATORY RESULT (OUTPATIENT)
Age: 43
End: 2023-06-30

## 2023-07-09 LAB
ALBUMIN SERPL ELPH-MCNC: 4.3 G/DL
ALP BLD-CCNC: 54 U/L
ALT SERPL-CCNC: 19 U/L
ANION GAP SERPL CALC-SCNC: 13 MMOL/L
AST SERPL-CCNC: 21 U/L
BILIRUB SERPL-MCNC: 0.5 MG/DL
BUN SERPL-MCNC: 13 MG/DL
CALCIUM SERPL-MCNC: 9.2 MG/DL
CHLORIDE SERPL-SCNC: 103 MMOL/L
CO2 SERPL-SCNC: 24 MMOL/L
CREAT SERPL-MCNC: 0.81 MG/DL
EGFR: 92 ML/MIN/1.73M2
GLUCOSE SERPL-MCNC: 95 MG/DL
HBV DNA # SERPL NAA+PROBE: <10 IU/ML
HCV RNA SERPL NAA+PROBE-LOG IU: NOT DETECTED LOGIU/ML
HEPATITIS A IGG ANTIBODY: REACTIVE
HEPB DNA PCR INT: DETECTED
HEPB DNA PCR LOG: <1 LOGIU/ML
HEPC RNA INTERP: NOT DETECTED
POTASSIUM SERPL-SCNC: 4.1 MMOL/L
PROT SERPL-MCNC: 6.7 G/DL
SODIUM SERPL-SCNC: 141 MMOL/L

## 2023-07-09 NOTE — ADDENDUM
[FreeTextEntry1] : 6/30/23\par CBC WNL \par CMP WNL ALT 19\par HBV DNA+ <10 IU/mL\par \par # Health Maintenance\par Immune to Hep A\par HCV RNA Not detected

## 2023-07-09 NOTE — PHYSICAL EXAM
[Smooth] : smooth [General Appearance - Alert] : alert [General Appearance - In No Acute Distress] : in no acute distress [General Appearance - Well Nourished] : well nourished [Sclera] : the sclera and conjunctiva were normal [Outer Ear] : the ears and nose were normal in appearance [Neck Appearance] : the appearance of the neck was normal [] : no respiratory distress [Abdomen Soft] : soft [Abdomen Tenderness] : non-tender [Abdomen Mass (___ Cm)] : no abdominal mass palpated [Abdomen Hernia] : no hernia was discovered [Abnormal Walk] : normal gait [Skin Color & Pigmentation] : normal skin color and pigmentation [Oriented To Time, Place, And Person] : oriented to person, place, and time [Impaired Insight] : insight and judgment were intact [Affect] : the affect was normal [Scleral Icterus] : No Scleral Icterus [Hepatojugular Reflux] : patient did not have a sustained hepatojugular reflux [Spider Angioma] : No spider angioma(s) were observed [Splenomegaly] : no splenomegaly [Abdominal  Ascites] : no ascites [Liver Palpable ___ Finger Breadths Below Costal Margin] : was not palpable below costal margin [Asterixis] : no asterixis observed [Jaundice] : No jaundice [Palmar Erythema] : no Palmar Erythema [Depression] : no depression

## 2023-07-09 NOTE — ASSESSMENT
[FreeTextEntry1] : Andres Garduno is a 41 y/o female with HBeAg-Negative chronic hepatitis B on Vemlidy\par \par [Plan]\par \par # HBeAg-Negative chronic hepatitis B\par - Continue Vemlidy (Refilled)\par - Emphasized the importance of compliance with medications and HCC screening\par - Education and counseling were provided to the patient.\par - Discussed at length with the patient that next course of action would depend on results\par - Labs in July 2023\par - US abd in July 2023 to screen HCC\par - RTO in 6 months after labs and US abd\par \par # Health Maintenance\par DEXA 2/2020 normal.\par Hepatitis A and C screening

## 2023-07-09 NOTE — REASON FOR VISIT
[Other: ______] : provided by LELE [Time Spent: ____ minutes] : Total time spent using  services: [unfilled] minutes. The patient's primary language is not English thus required  services. [Source: ______] : History obtained from [unfilled] [Interpreters_IDNumber] : [id number] [FreeTextEntry3] : Mandarin [Interpreters_FullName] : Sharmila

## 2023-07-09 NOTE — HISTORY OF PRESENT ILLNESS
[FreeTextEntry1] : Former pt of Dr. Jordan\par Andres Garduno is a 43 y/o female with HBeAg-Negative chronic hepatitis B on Vemlidy who presents to establish the care. She recently had labs/fibroscan/imaging. She reportedly was treated with injection for hepatitis B, (IFN?) for 3 months which was discontinued due to intolerance. She was diagnosed with hep B in 2002. Patient was switched from TDF to Vemlidy since 6/2019.\par \par Today she reports feeling well and denies any physical complaints. \par \par [Medical Hx] None\par [Surgical Hx] None\par [Social Hx] \par Alcohol: 2-3/year Tobacco: Quit 20 years ago \par illicit drug: No     Herb and dietary Supplement: No\par [FMH of liver disease] None\par \par [Labs]\par 1/23/23\par INR 1.10 CBC wnl  \par AFP <1.8 CMP wnl ALT 14\par HBsAg+ HBV DNA detected <10IU/mL\par \par In 2019\par HBeAg-\par \par [Imaging]\par US abd 1/26/23: Mildly coarsened hepatic echotexture. No focal hepatic lesion.\par \par Fibroscan 1/23/23: F0-1 3.3kPa S0\par \par MRI/MRCP 11/6/21: No pancreatic lesion identified to correspond with sonographic finding. I suspect in retrospect that the finding on sonogram represents a segment of the tortuous splenic vein in close proximity to the pancreatic tail.\par \par [Procedures]\par EGD 2 years ago: normal per pt. Gastric infection? (not H. pylori) and treated with medication.\par Colonoscopy 2 years ago: Nomal per pt\par \par \par

## 2023-07-13 ENCOUNTER — APPOINTMENT (OUTPATIENT)
Dept: HEPATOLOGY | Facility: CLINIC | Age: 43
End: 2023-07-13

## 2023-07-13 NOTE — HISTORY OF PRESENT ILLNESS
[FreeTextEntry1] : *THIS IS PRE-CHARTING FOR FUTURE VISIT\par \par  service: Mandarin \par \par Andres Garduno is a 44 y/o female with HBeAg-Negative chronic hepatitis B on Vemlidy who presents to establish the care. She recently had labs/fibroscan/imaging. She was diagnosed with hep B in 2002. She reportedly was treated with injection for hepatitis B, (IFN?) for 3 months which was discontinued due to intolerance.  Patient was switched from TDF to Vemlidy since 6/2019.\par \par - Today she reports feeling well and denies any physical complaints. \par - US abd was done on 7/7/23 but the report is not available yet. \par \par [Medical Hx] None\par [Surgical Hx] None\par [Social Hx] \par Alcohol: 2-3times/year \par Tobacco: Quit 20 years ago \par illicit drug: Denies \par Herb and dietary Supplement: Denies\par [FMH of liver disease] None\par \par [Labs]\par 6/30/23\par CBC WNL \par CMP WNL ALT 19\par HBV DNA+ <10 IU/mL\par \par 1/23/23 HBsAg+\par In 2019 HBeAg-\par \par [Imaging]\par US abd 1/26/23: Mildly coarsened hepatic echotexture. No focal hepatic lesion.\par \par Fibroscan 1/23/23: F0-1 3.3kPa S0\par \par MRI/MRCP 11/6/21: No pancreatic lesion identified to correspond with sonographic finding. I suspect in retrospect that the finding on sonogram represents a segment of the tortuous splenic vein in close proximity to the pancreatic tail.\par \par [Procedures]\par EGD 2 years ago: normal per pt. Gastric infection? (not H. pylori) and treated with medication.\par Colonoscopy 2 years ago: Nomal per pt\par \par [Plan]\par \par # HBeAg-Negative chronic hepatitis B\par - Continue Vemlidy (enough til Feb2024)\par - Emphasized the importance of compliance with medications and HCC screening\par - Education and counseling were provided to the patient.\par - Labs in 6 moths\par - US abd in a month and in 6 months to screen HCC\par - RTO Jan 2024\par \par # Health Maintenance\par - DEXA 2/2020 normal.\par - Immune to Hep A\par - HCV RNA Not detected \par \par \par

## 2023-07-19 ENCOUNTER — OUTPATIENT (OUTPATIENT)
Dept: OUTPATIENT SERVICES | Facility: HOSPITAL | Age: 43
LOS: 1 days | End: 2023-07-19
Payer: COMMERCIAL

## 2023-07-19 ENCOUNTER — APPOINTMENT (OUTPATIENT)
Dept: ULTRASOUND IMAGING | Facility: CLINIC | Age: 43
End: 2023-07-19
Payer: COMMERCIAL

## 2023-07-19 DIAGNOSIS — B18.1 CHRONIC VIRAL HEPATITIS B WITHOUT DELTA-AGENT: ICD-10-CM

## 2023-07-19 PROCEDURE — 76700 US EXAM ABDOM COMPLETE: CPT | Mod: 26

## 2023-07-19 PROCEDURE — 76700 US EXAM ABDOM COMPLETE: CPT

## 2023-07-20 ENCOUNTER — APPOINTMENT (OUTPATIENT)
Dept: HEPATOLOGY | Facility: CLINIC | Age: 43
End: 2023-07-20
Payer: COMMERCIAL

## 2023-07-20 VITALS
RESPIRATION RATE: 16 BRPM | HEART RATE: 78 BPM | OXYGEN SATURATION: 100 % | SYSTOLIC BLOOD PRESSURE: 121 MMHG | HEIGHT: 61 IN | BODY MASS INDEX: 22.09 KG/M2 | WEIGHT: 117 LBS | TEMPERATURE: 97.8 F | DIASTOLIC BLOOD PRESSURE: 71 MMHG

## 2023-07-20 PROCEDURE — 99214 OFFICE O/P EST MOD 30 MIN: CPT

## 2023-07-23 NOTE — HISTORY OF PRESENT ILLNESS
[FreeTextEntry1] : Andres Garduno is a 43y female with HBeAg-Negative chronic hepatitis B who presents for follow-up. She was diagnosed with hep B in 2002. She was diagnosed with hep B in 2002. She had been initially treated with TDF in 2018 that was switched  to Vemlidy in 6/2019 due to "better medication".\par \par [Interim events]\par - 7/2023 [facility] "Vemlidy has been removed from patient’s insurance formulary. Her insurance prefers entecavir, tenofovir, or lamivudine."\par - pt’s renal function (GFR > 50), would change TAF to  mg a day. \par - Sent TDF to [facility]. She received meds. Pt waned to discuss about this in person today. \par - Today she reports feeling well and denies any physical complaints. \par \par [Medical Hx] Pyelitis, Kidney stone \par [Surgical Hx] None\par [Social Hx] \par Alcohol: 2-3 times/year \par Tobacco: Quit 20 years ago \par illicit drug: Denies \par Herb and dietary Supplement: Denies\par [FMH of liver disease] None\par \par [Labs]\par 6/30/23\par CBC WNL \par CMP WNL ALT 19\par HBV DNA+ <10 IU/mL\par \par 1/23/23 HBsAg+\par In 2019 HBeAg-\par \par [Imaging]\par US abd 7/19/23: Liver: Mildly coarsened hepatic echotexture. No focal hepatic lesion.\par Otherwise, unremarkable. \par \par US abd 1/26/23: Mildly coarsened hepatic echotexture. No focal hepatic lesion.\par \par Fibroscan 1/23/23: F0-1 (3.3kPa) S0\par \par MRI/MRCP 11/6/21: No pancreatic lesion identified to correspond with sonographic finding. I suspect in retrospect that the finding on sonogram represents a segment of the tortuous splenic vein in close proximity to the pancreatic tail.\par \par [Procedures]\par EGD 2 years ago: normal per pt. Gastric infection? ("not H. pylori") and treated with medication.\par Colonoscopy 2 years ago: Nomal per pt\par

## 2023-07-23 NOTE — REVIEW OF SYSTEMS
[Fever] : no fever [Chills] : no chills [Feeling Tired] : not feeling tired [Eye Pain] : no eye pain [Earache] : no earache [Chest Pain] : no chest pain [Palpitations] : no palpitations [Shortness Of Breath] : no shortness of breath [Abdominal Pain] : no abdominal pain [Vomiting] : no vomiting [Constipation] : no constipation [Diarrhea] : no diarrhea [Heartburn] : no heartburn [Melena] : no melena [Limb Swelling] : no limb swelling [Itching] : no itching [Confused] : no confusion [Dizziness] : no dizziness [Fainting] : no fainting [Anxiety] : no anxiety [Easy Bleeding] : no tendency for easy bleeding

## 2023-07-23 NOTE — ASSESSMENT
[FreeTextEntry1] : Andres Garduno is a 43y female with HBeAg-Negative chronic hepatitis B on Vemlidy. She was diagnosed with hep B in 2002. She had been initially treated with TDF in 2018 that was switched  to Vemlidy in 6/2019 due to "better medication".\par \par [Plan]\par \par # HBeAg-Negative chronic hepatitis B\par - TDF due to insurance\par - Emphasized the importance of compliance with medications and HCC screening\par - Education and counseling were provided to the patient.\par - Fibroscan 1/23/23: F0-1 (3.3kPa)\par - Labs in 6 moths\par - US abd in 6 months to screen HCC\par - RTO Jan 2024\par \par # Health Maintenance\par - DEXA 2/2020 normal.\par - Immune to Hep A\par - Hep C Ab Negative\par

## 2023-07-31 ENCOUNTER — NON-APPOINTMENT (OUTPATIENT)
Age: 43
End: 2023-07-31

## 2024-03-03 NOTE — HISTORY OF PRESENT ILLNESS
[FreeTextEntry1] : Andres Garduno is a 43y female with HBeAg- chronic hepatitis B on TDF who presents for follow-up. She was diagnosed with hep B in 2002. She initially received treatment with TDF in 2018, which was switched to Vemlidy in June 2019.  - 7/2023 Insurance denies Vemlidy and prefers entecavir, tenofovir, or lamivudine." - pt's renal function (GFR > 50), switched from TAF to  mg a day.  - Labs and US abd were ordered but not done yet - Today she reports feeling well and no physical complaints.  [Medical Hx] Pyelitis, Kidney stone, HBV [Surgical Hx] None [Social Hx] Alcohol: 2-3 times/year Tobacco: Quit 20 years ago illicit drug: Denies Herb and dietary Supplement: Denies [FMH of liver disease] None  [Labs] 6/30/23 CBC WNL  CMP WNL ALT 19 HBV DNA+ <10 IU/mL  1/23/23 HBsAg+ In 2019 HBeAg-  [Imaging] US abd 7/19/23: Liver: Mildly coarsened hepatic echotexture. No focal hepatic lesion. Otherwise, unremarkable.  US abd 1/26/23: Mildly coarsened hepatic echotexture. No focal hepatic lesion.  Fibroscan 1/23/23: F0-1 (3.3kPa) S0  MRI/MRCP 11/6/21: No pancreatic lesion identified to correspond with sonographic finding. I suspect in retrospect that the finding on sonogram represents a segment of the tortuous splenic vein in close proximity to the pancreatic tail.  [Procedures] EGD 2 years ago: normal per pt. Gastric infection? ("not H. pylori") and treated with medication.  Colonoscopy 2 years ago: Nomal per pt     ___________   43y female   [A/P]  # HBeAg-Negative chronic hepatitis B - TDF due to insurance - Emphasized the importance of compliance with medications and HCC screening - Education and counseling were provided to the patient. - Fibroscan 1/23/23: F0-1 (3.3kPa)  - Labs  - US abd  to screen HCC - RTO   # Health Maintenance - DEXA 2/2020 normal. - Immune to Hep A - Hep C Ab Negative

## 2024-03-03 NOTE — PHYSICAL EXAM
[TextEntry] : Stigmata of Liver Disease: No Scleral Icterus and patient did not have a sustained hepatojugular reflux. No spider angioma(s) were observed. no abdominal bruit, no ascites, no ascites fluid wave, ascites is not tense, no shifting dullness of ascites, no splenomegaly and no caput medusae observed . normal Liver size 14 cm. Liver edge was palpable finger breadths below costal margin. Liver edge is non-tender and irregular. no asterixis observed. No jaundice and no Palmar Erythema. no depression. Constitutional: alert, in no acute distress, well nourished, well developed and healthy appearing. Eyes: the sclera and conjunctiva were normal. ENT: the ears and nose were normal in appearance and the lips and gums were normal. Neck: the appearance of the neck was normal, the neck was supple, no neck mass was observed and the thyroid was not enlarged . there was no jugular-venous distention. Pulmonary: no respiratory distress, normal respiratory rhythm and effort and lungs were clear to auscultation bilaterally. Heart: heart rate was normal and rhythm regular, normal S1 and S2 and no murmurs. Vascular:. there was no peripheral edema. Abdomen: normal bowel sounds, soft and non-tender . liver edge in epigastrium. Lymphatics: The supraclavicular nodes were non-tender and normal size. Back: no costovertebral angle tenderness. Musculoskeletal: no clubbing or cyanosis of the fingernails and no involuntary movements were seen. Skin: normal skin color and pigmentation, normal skin turgor and no rash. Neurological: no focal deficits. Psychiatric: oriented to person, place, and time.

## 2024-03-03 NOTE — REVIEW OF SYSTEMS
[TextEntry] : Pertinent history:  no needlestick exposure, no infected sexual partner, no IV drug use, no tattoos, no body piercing, no hemodialysis, no transfusion before 1992, no transplant before 1992, no incarceration, no alcohol abuse, no autoimmune disorder, no household contact to HBV, no travel to an endemic area, no occupational exposure and no cocaine use.   Currently, the condition is moderate in severity and unchanged.   Symptoms: denies fatigue, denies malaise, denies fever, denies arthralgias, denies myalgias, denies jaundice, denies abdominal pain, denies dark urine, denies pale stools, denies insomnia, denies rash, denies pruritus and denies shortness of breath . no recent weight gain.  no recent weight loss.

## 2024-03-04 ENCOUNTER — APPOINTMENT (OUTPATIENT)
Dept: HEPATOLOGY | Facility: CLINIC | Age: 44
End: 2024-03-04

## 2024-03-13 ENCOUNTER — APPOINTMENT (OUTPATIENT)
Dept: ULTRASOUND IMAGING | Facility: CLINIC | Age: 44
End: 2024-03-13
Payer: COMMERCIAL

## 2024-03-13 ENCOUNTER — OUTPATIENT (OUTPATIENT)
Dept: OUTPATIENT SERVICES | Facility: HOSPITAL | Age: 44
LOS: 1 days | End: 2024-03-13
Payer: COMMERCIAL

## 2024-03-13 DIAGNOSIS — B18.1 CHRONIC VIRAL HEPATITIS B WITHOUT DELTA-AGENT: ICD-10-CM

## 2024-03-13 PROCEDURE — 76700 US EXAM ABDOM COMPLETE: CPT | Mod: 26

## 2024-03-13 PROCEDURE — 76700 US EXAM ABDOM COMPLETE: CPT

## 2024-03-20 LAB
AFP-TM SERPL-MCNC: 1.9 NG/ML
ALBUMIN SERPL ELPH-MCNC: 4.5 G/DL
ALP BLD-CCNC: 67 U/L
ALT SERPL-CCNC: 32 U/L
ANION GAP SERPL CALC-SCNC: 12 MMOL/L
AST SERPL-CCNC: 22 U/L
BILIRUB SERPL-MCNC: 0.3 MG/DL
BUN SERPL-MCNC: 9 MG/DL
CALCIUM SERPL-MCNC: 9.1 MG/DL
CHLORIDE SERPL-SCNC: 104 MMOL/L
CO2 SERPL-SCNC: 26 MMOL/L
CREAT SERPL-MCNC: 0.76 MG/DL
EGFR: 100 ML/MIN/1.73M2
GLUCOSE SERPL-MCNC: 91 MG/DL
HEPB DNA PCR INT: NOT DETECTED
HEPB DNA PCR LOG: NOT DETECTED LOGIU/ML
POTASSIUM SERPL-SCNC: 4.3 MMOL/L
PROT SERPL-MCNC: 7.1 G/DL
SODIUM SERPL-SCNC: 141 MMOL/L

## 2024-03-21 ENCOUNTER — APPOINTMENT (OUTPATIENT)
Dept: HEPATOLOGY | Facility: CLINIC | Age: 44
End: 2024-03-21
Payer: COMMERCIAL

## 2024-03-21 DIAGNOSIS — R11.0 NAUSEA: ICD-10-CM

## 2024-03-21 DIAGNOSIS — T88.7XXA UNSPECIFIED ADVERSE EFFECT OF DRUG OR MEDICAMENT, INITIAL ENCOUNTER: ICD-10-CM

## 2024-03-21 DIAGNOSIS — B18.1 CHRONIC VIRAL HEPATITIS B W/OUT DELTA-AGENT: ICD-10-CM

## 2024-03-21 DIAGNOSIS — R10.9 UNSPECIFIED ABDOMINAL PAIN: ICD-10-CM

## 2024-03-21 PROCEDURE — 76981 USE PARENCHYMA: CPT

## 2024-03-21 PROCEDURE — 99214 OFFICE O/P EST MOD 30 MIN: CPT

## 2024-03-24 PROBLEM — T88.7XXA MEDICATION SIDE EFFECT: Status: ACTIVE | Noted: 2024-03-24

## 2024-03-24 PROBLEM — R10.9 ABDOMINAL DISCOMFORT: Status: ACTIVE | Noted: 2024-03-24

## 2024-03-24 PROBLEM — R11.0 NAUSEA: Status: ACTIVE | Noted: 2024-03-24

## 2024-03-24 RX ORDER — TENOFOVIR ALAFENAMIDE 25 MG/1
25 TABLET ORAL
Qty: 90 | Refills: 3 | Status: DISCONTINUED | COMMUNITY
Start: 2019-06-13 | End: 2024-03-24

## 2024-03-24 RX ORDER — TENOFOVIR ALAFENAMIDE 25 MG/1
25 TABLET ORAL
Qty: 90 | Refills: 3 | Status: ACTIVE | COMMUNITY
Start: 2024-03-24 | End: 1900-01-01

## 2024-03-24 NOTE — HISTORY OF PRESENT ILLNESS
[FreeTextEntry1] :  LEIDY Ernst -[address] [place] [address].  Andres Garduno is a 43y female with HBeAg- chronic hepatitis B on TDF who presents for follow-up. She was diagnosed with hep B in 2002. She initially received treatment with TDF in 2018, which was switched to Vemlidy in June 2019. However, in 7/2023, Insurance denied Vemlidy and preferred entecavir, tenofovir, or lamivudine. So we switched Vemlidy to  mg.  - Fibroscan today 3/21/24:  4.6 kPa S0-1 F0-1  - Today pt reports nausea and abd discomfort after taking TDF (same Sx with food).   [Medical Hx] Pyelitis, Kidney stone, HBV [Surgical Hx] None [Social Hx] Alcohol: 2-3 times/year Tobacco: Quit 20 years ago illicit drug: Denies Herb and dietary Supplement: Denies [FMH of liver disease] None  [Labs] 3/13/24 CBC WNL  CMP WNL (AST/ALT 22/32) HBV DNA Not detected  1/23/23 HBsAg+ In 2019 HBeAg-  [Imaging] US abd 3/13/24: Liver: Mildly coarsened echotexture. No focal hepatic lesion. Smooth hepatic contour. Bile ducts: Normal caliber. Common bile duct measures 2 mm. Gallbladder: Within normal limits. Pancreas: Visualized portions are within normal limits. Spleen: 10.1 cm. Within normal limits. Right kidney: 10.9 cm. No hydronephrosis. Left kidney: 9.3 cm. No hydronephrosis. Ascites: None. Aorta and IVC: Visualized portions are within normal limits. US abd 1/26/23: Mildly coarsened hepatic echotexture. No focal hepatic lesion.  Fibroscan 1/23/23: F0-1 (3.3kPa) S0  MRI/MRCP 11/6/21: No pancreatic lesion identified to correspond with sonographic finding. I suspect in retrospect that the finding on sonogram represents a segment of the tortuous splenic vein in close proximity to the pancreatic tail.  [Procedures] EGD 2 years ago: normal per pt. Gastric infection? ("not H. pylori") and treated with medication.  Colonoscopy 2 years ago: Nomal per pt

## 2024-03-24 NOTE — PHYSICAL EXAM
[Hepatojugular Reflux] : patient did not have a sustained hepatojugular reflux [Scleral Icterus] : No Scleral Icterus [Spider Angioma] : No spider angioma(s) were observed [Abdominal  Ascites] : no ascites [Caput Medusae] : no caput medusae observed [Splenomegaly] : no splenomegaly [Non-Tender] : non-tender [Jaundice] : No jaundice [Palmar Erythema] : no Palmar Erythema [Hallucinations] : ~T no ~M hallucinations [Delusions] : no ~T delusions [General Appearance - Alert] : alert [General Appearance - In No Acute Distress] : in no acute distress [Sclera] : the sclera and conjunctiva were normal [Outer Ear] : the ears and nose were normal in appearance [Neck Appearance] : the appearance of the neck was normal [Murmurs] : no murmurs [Abdomen Tenderness] : non-tender [Edema] : there was no peripheral edema [Abdomen Hernia] : no hernia was discovered [Abnormal Walk] : normal gait [] : no rash [Skin Color & Pigmentation] : normal skin color and pigmentation [No Focal Deficits] : no focal deficits [Oriented To Time, Place, And Person] : oriented to person, place, and time [Impaired Insight] : insight and judgment were intact [Affect] : the affect was normal

## 2024-03-24 NOTE — REVIEW OF SYSTEMS
[Chills] : no chills [Fever] : no fever [Feeling Tired] : not feeling tired [Eye Pain] : no eye pain [Earache] : no earache [Dry Eyes] : no dryness of the eyes [Nosebleeds] : no nosebleeds [Palpitations] : no palpitations [Chest Pain] : no chest pain [Lower Ext Edema] : no lower extremity edema [Shortness Of Breath] : no shortness of breath [Cough] : no cough [Abdominal Pain] : no abdominal pain [Vomiting] : no vomiting [Constipation] : no constipation [Diarrhea] : no diarrhea [Heartburn] : no heartburn [Melena] : no melena [Joint Swelling] : no joint swelling [Limb Swelling] : no limb swelling [Skin Lesions] : no skin lesions [Itching] : no itching [Confused] : no confusion [Dizziness] : no dizziness [Fainting] : no fainting [Anxiety] : no anxiety [Easy Bleeding] : no tendency for easy bleeding

## 2024-03-24 NOTE — ASSESSMENT
[FreeTextEntry1] : ___________  43y female  [A/P]  # HBeAg-Negative chronic hepatitis B - Continue TDF w/ food for now (due to insurance) - Now pt has GI Sx w/ food after taking TDF - Pt states that no s/e when she was taking Vemlidy  - Will get PA for Vemlidy - Emphasized the importance of compliance with medications and HCC screening - Education and counseling were provided to the patient.  - Labs in Sep 2024 - US abd in Sep 2024 to screen HCC - Fibroscan today 3/21/24: - RTO in Sep 2024  # Health Maintenance - DEXA 2/2020 normal. - Immune to Hep A - Hep C Ab Negative

## 2024-03-29 RX ORDER — TENOFOVIR DISOPROXIL FUMARATE 300 MG/1
300 TABLET ORAL DAILY
Qty: 90 | Refills: 3 | Status: DISCONTINUED | COMMUNITY
Start: 2023-07-14 | End: 2024-03-29

## 2024-04-02 PROBLEM — B18.1 CHRONIC HEPATITIS B: Status: ACTIVE | Noted: 2018-05-23

## 2024-10-04 ENCOUNTER — APPOINTMENT (OUTPATIENT)
Dept: ULTRASOUND IMAGING | Facility: CLINIC | Age: 44
End: 2024-10-04
Payer: COMMERCIAL

## 2024-10-04 ENCOUNTER — OUTPATIENT (OUTPATIENT)
Dept: OUTPATIENT SERVICES | Facility: HOSPITAL | Age: 44
LOS: 1 days | End: 2024-10-04
Payer: COMMERCIAL

## 2024-10-04 DIAGNOSIS — B18.1 CHRONIC VIRAL HEPATITIS B WITHOUT DELTA-AGENT: ICD-10-CM

## 2024-10-04 PROCEDURE — 76700 US EXAM ABDOM COMPLETE: CPT

## 2024-10-04 PROCEDURE — 76700 US EXAM ABDOM COMPLETE: CPT | Mod: 26

## 2024-10-14 ENCOUNTER — APPOINTMENT (OUTPATIENT)
Dept: HEPATOLOGY | Facility: CLINIC | Age: 44
End: 2024-10-14
Payer: COMMERCIAL

## 2024-10-14 VITALS
HEIGHT: 61 IN | SYSTOLIC BLOOD PRESSURE: 118 MMHG | OXYGEN SATURATION: 99 % | WEIGHT: 121 LBS | DIASTOLIC BLOOD PRESSURE: 75 MMHG | TEMPERATURE: 97.6 F | BODY MASS INDEX: 22.84 KG/M2 | HEART RATE: 69 BPM

## 2024-10-14 DIAGNOSIS — B18.1 CHRONIC VIRAL HEPATITIS B W/OUT DELTA-AGENT: ICD-10-CM

## 2024-10-14 PROCEDURE — 99214 OFFICE O/P EST MOD 30 MIN: CPT

## 2025-04-17 ENCOUNTER — APPOINTMENT (OUTPATIENT)
Dept: ULTRASOUND IMAGING | Facility: CLINIC | Age: 45
End: 2025-04-17
Payer: COMMERCIAL

## 2025-04-17 ENCOUNTER — OUTPATIENT (OUTPATIENT)
Dept: OUTPATIENT SERVICES | Facility: HOSPITAL | Age: 45
LOS: 1 days | End: 2025-04-17
Payer: COMMERCIAL

## 2025-04-17 DIAGNOSIS — B18.1 CHRONIC VIRAL HEPATITIS B WITHOUT DELTA-AGENT: ICD-10-CM

## 2025-04-17 PROCEDURE — 76705 ECHO EXAM OF ABDOMEN: CPT | Mod: 26

## 2025-04-17 PROCEDURE — 76705 ECHO EXAM OF ABDOMEN: CPT

## 2025-04-21 ENCOUNTER — APPOINTMENT (OUTPATIENT)
Dept: HEPATOLOGY | Facility: CLINIC | Age: 45
End: 2025-04-21
Payer: COMMERCIAL

## 2025-04-21 VITALS
WEIGHT: 120 LBS | BODY MASS INDEX: 22.66 KG/M2 | HEIGHT: 61 IN | RESPIRATION RATE: 16 BRPM | HEART RATE: 68 BPM | OXYGEN SATURATION: 98 % | SYSTOLIC BLOOD PRESSURE: 142 MMHG | DIASTOLIC BLOOD PRESSURE: 75 MMHG | TEMPERATURE: 97.3 F

## 2025-04-21 DIAGNOSIS — B18.1 CHRONIC VIRAL HEPATITIS B W/OUT DELTA-AGENT: ICD-10-CM

## 2025-04-21 PROCEDURE — 99213 OFFICE O/P EST LOW 20 MIN: CPT
